# Patient Record
Sex: FEMALE | Race: WHITE | NOT HISPANIC OR LATINO | Employment: PART TIME | URBAN - METROPOLITAN AREA
[De-identification: names, ages, dates, MRNs, and addresses within clinical notes are randomized per-mention and may not be internally consistent; named-entity substitution may affect disease eponyms.]

---

## 2017-02-07 ENCOUNTER — TELEPHONE (OUTPATIENT)
Dept: OBSTETRICS AND GYNECOLOGY | Facility: CLINIC | Age: 52
End: 2017-02-07

## 2017-02-07 DIAGNOSIS — Z12.39 BREAST CANCER SCREENING: Primary | ICD-10-CM

## 2017-02-07 NOTE — TELEPHONE ENCOUNTER
Dr. Toribio's pt calling, pt is requesting a 3D screening mammo be sent to [facility]. Pt would like to be called once orders are sent at [phone].

## 2017-02-16 ENCOUNTER — OFFICE VISIT (OUTPATIENT)
Dept: OBSTETRICS AND GYNECOLOGY | Facility: CLINIC | Age: 52
End: 2017-02-16
Payer: COMMERCIAL

## 2017-02-16 VITALS
WEIGHT: 101.44 LBS | SYSTOLIC BLOOD PRESSURE: 110 MMHG | BODY MASS INDEX: 19.15 KG/M2 | DIASTOLIC BLOOD PRESSURE: 78 MMHG | HEIGHT: 61 IN

## 2017-02-16 DIAGNOSIS — R87.615 UNSATISFACTORY CERVICAL PAPANICOLAOU SMEAR: Primary | ICD-10-CM

## 2017-02-16 PROCEDURE — 99999 PR PBB SHADOW E&M-EST. PATIENT-LVL II: CPT | Mod: PBBFAC,,, | Performed by: OBSTETRICS & GYNECOLOGY

## 2017-02-16 PROCEDURE — 87624 HPV HI-RISK TYP POOLED RSLT: CPT

## 2017-02-16 PROCEDURE — 99212 OFFICE O/P EST SF 10 MIN: CPT | Mod: S$GLB,,, | Performed by: OBSTETRICS & GYNECOLOGY

## 2017-02-16 RX ORDER — CYCLOSPORINE 0.5 MG/ML
EMULSION OPHTHALMIC
COMMUNITY
Start: 2016-11-22 | End: 2024-02-19

## 2017-02-16 NOTE — PROGRESS NOTES
Subjective:       Patient ID: Terri Sanon is a 51 y.o. female.    Chief Complaint:  Well Woman (Annual Exam last pap  Hpv Equivical, last mammo 11/6/15 neg, Dexa 10/2013 osteopenia)      History of Present Illness  51-year-old status post annual exam 2016 with a normal Pap but an equivocal HPV.  Patient here for repeat HPV test.      GYN & OB History  No LMP recorded. Patient is postmenopausal.   Date of Last Pap: 2016    OB History    Para Term  AB SAB TAB Ectopic Multiple Living   4 3 3  1 1    3      # Outcome Date GA Lbr Tyler/2nd Weight Sex Delivery Anes PTL Lv   4 Term  39w0d  3.062 kg (6 lb 12 oz) F Vag-Spont   Y   3 Term  39w0d  3.175 kg (7 lb) M Vag-Spont   Y   2 SAB  7w0d       N   1 Term  40w0d  3.289 kg (7 lb 4 oz) M Vag-Spont   Y          Review of Systems  Review of Systems   Constitutional: Negative for fatigue and unexpected weight change.   Respiratory: Negative for shortness of breath.    Cardiovascular: Negative for chest pain.   Gastrointestinal: Negative for abdominal pain, constipation, diarrhea, nausea and vomiting.   Genitourinary: Negative for dysuria.   Musculoskeletal: Negative for back pain.   Skin: Negative for rash.   Neurological: Negative for headaches.   Hematological: Does not bruise/bleed easily.   Psychiatric/Behavioral: Negative for behavioral problems.        Objective:   Physical Exam:   Constitutional: She appears well-developed.               Genitourinary: Vagina normal and uterus normal. No vaginal discharge found.   Genitourinary Comments: Cervix appears normal.  Pap smear performed.                      Assessment/ Plan:     Orders Placed This Encounter    HPV DNA probe, amplified       Terri was seen today for well woman.    Diagnoses and all orders for this visit:    Unsatisfactory cervical Papanicolaou smear  -     HPV DNA probe, amplified   HPV was equivocal.  Here today for repeat HPV test only.      Return for  annual.      Health Maintenance       Date Due Completion Date    Hepatitis C Screening 1964 ---    Lipid Panel 1964 ---    TETANUS VACCINE 5/21/1982 ---    Mammogram 5/21/2004 ---    Colonoscopy 5/21/2014 ---    Influenza Vaccine 8/1/2016 ---    Pap Smear 8/4/2019 8/4/2016

## 2017-02-22 LAB — HUMAN PAPILLOMAVIRUS (HPV): DETECTED

## 2017-09-28 ENCOUNTER — OFFICE VISIT (OUTPATIENT)
Dept: OBSTETRICS AND GYNECOLOGY | Facility: CLINIC | Age: 52
End: 2017-09-28
Payer: COMMERCIAL

## 2017-09-28 VITALS
SYSTOLIC BLOOD PRESSURE: 107 MMHG | BODY MASS INDEX: 19.57 KG/M2 | WEIGHT: 103.63 LBS | HEIGHT: 61 IN | DIASTOLIC BLOOD PRESSURE: 78 MMHG

## 2017-09-28 DIAGNOSIS — Z11.51 SCREENING FOR HUMAN PAPILLOMAVIRUS: ICD-10-CM

## 2017-09-28 DIAGNOSIS — Z12.4 SCREENING FOR MALIGNANT NEOPLASM OF THE CERVIX: ICD-10-CM

## 2017-09-28 DIAGNOSIS — Z01.419 ENCOUNTER FOR GYNECOLOGICAL EXAMINATION: Primary | ICD-10-CM

## 2017-09-28 PROCEDURE — 99396 PREV VISIT EST AGE 40-64: CPT | Mod: S$GLB,,, | Performed by: OBSTETRICS & GYNECOLOGY

## 2017-09-28 PROCEDURE — 87624 HPV HI-RISK TYP POOLED RSLT: CPT

## 2017-09-28 PROCEDURE — 88175 CYTOPATH C/V AUTO FLUID REDO: CPT

## 2017-09-28 PROCEDURE — 99999 PR PBB SHADOW E&M-EST. PATIENT-LVL III: CPT | Mod: PBBFAC,,, | Performed by: OBSTETRICS & GYNECOLOGY

## 2017-09-28 NOTE — PROGRESS NOTES
"CC: Well woman exam    Terri Sanon is a 52 y.o. female  presents for a well woman exam.  She is established.  LMP: No LMP recorded. Patient is postmenopausal..   No c/o Doing well   Menopausal age 39. Denies current hot flashes, night sweats or vaginal dryness.  Denies abdominal pain or vaginal bleed   Annual Exam,   Last hpv  positive, last pap    normal, last mammo  [facility] normal    Health Maintenance   Topic Date Due    Hepatitis C Screening  1965    Lipid Panel  1964    TETANUS VACCINE  1982    Mammogram  2004    Colonoscopy  2014    Influenza Vaccine  2017    Pap Smear with HPV Cotest  2019         Past Medical History:   Diagnosis Date    Abnormal Pap smear of cervix 2016 pap normal but Hpv equivical,  Hpv positive    Osteopenia     Premature menopause     Menopausal age 39       Past Surgical History:   Procedure Laterality Date    APPENDECTOMY         OB History    Para Term  AB Living   4 3 3   1 3   SAB TAB Ectopic Multiple Live Births   1       3      # Outcome Date GA Lbr Tyler/2nd Weight Sex Delivery Anes PTL Lv   4 Term  39w0d  3.062 kg (6 lb 12 oz) F Vag-Spont   RAVINDRA   3 Term  39w0d  3.175 kg (7 lb) M Vag-Spont   RAVINDRA   2 SAB  7w0d       DEC    Term  40w0d  3.289 kg (7 lb 4 oz) M Vag-Spont   RAVINDRA          Family History   Problem Relation Age of Onset    Kidney failure Father     No Known Problems Mother     No Known Problems Brother     No Known Problems Sister     Breast cancer Neg Hx     Colon cancer Neg Hx     Diabetes Neg Hx        Social History   Substance Use Topics    Smoking status: Never Smoker    Smokeless tobacco: Never Used    Alcohol use Yes       /78   Ht 5' 1" (1.549 m)   Wt 47 kg (103 lb 9.9 oz)   BMI 19.58 kg/m²       ROS:  GENERAL: Denies weight gain or weight loss. Feeling well overall.   SKIN: Denies rash or lesions.   HEAD: " Denies head injury or headache.   NODES: Denies enlarged lymph nodes.   CHEST: Denies chest pain or shortness of breath.   CARDIOVASCULAR: Denies palpitations or left sided chest pain.   ABDOMEN: No abdominal pain, constipation, diarrhea, nausea, vomiting or rectal bleeding.   URINARY: No frequency, dysuria, hematuria, or burning on urination.    Physical Exam:    APPEARANCE: Well nourished, well developed, in no acute distress.  AFFECT: WNL, alert and oriented x 3  SKIN: No acne or hirsutism  ABDOMEN: Soft.  No tenderness or masses.  No hepatosplenomegaly.  No hernias.  BREASTS: Symmetrical, no skin changes or visible lesions.  No palpable masses, nipple discharge bilaterally.  PELVIC: Normal external genitalia without lesions.  Normal hair distribution.  Adequate perineal body, normal urethral meatus.  Vagina moist and well rugated without lesions or discharge.  Cervix pink, without lesions, discharge or tenderness.  No significant cystocele or rectocele.  Bimanual exam shows uterus to be normal size, regular, mobile and nontender.  Adnexa without masses or tenderness.    EXTREMITIES: No edema.    ASSESSMENT AND PLAN  1. Encounter for gynecological examination     2. Screening for malignant neoplasm of the cervix  Liquid-based pap smear, screening   3. Screening for human papillomavirus  HPV High Risk Genotypes, PCR       Patient was counseled today on A.C.S. Pap guidelines and recommendations for yearly pelvic exams, mammograms and monthly self breast exams; to see her PCP for other health maintenance.     Return in about 6 months (around 3/28/2018).

## 2017-10-05 LAB
HPV HR 12 DNA CVX QL NAA+PROBE: NEGATIVE
HPV16 DNA SPEC QL NAA+PROBE: NEGATIVE
HPV18 DNA SPEC QL NAA+PROBE: NEGATIVE

## 2017-10-05 NOTE — PROGRESS NOTES
Benjamin Holland,  Your pap is normal and your HPV test is negative.  JUANA BRADSHAW!!!!  Dr Toribio

## 2018-03-09 ENCOUNTER — TELEPHONE (OUTPATIENT)
Dept: OBSTETRICS AND GYNECOLOGY | Facility: CLINIC | Age: 53
End: 2018-03-09

## 2018-03-09 DIAGNOSIS — Z12.39 BREAST CANCER SCREENING: Primary | ICD-10-CM

## 2019-01-17 ENCOUNTER — OFFICE VISIT (OUTPATIENT)
Dept: OBSTETRICS AND GYNECOLOGY | Facility: CLINIC | Age: 54
End: 2019-01-17
Payer: COMMERCIAL

## 2019-01-17 VITALS
DIASTOLIC BLOOD PRESSURE: 80 MMHG | HEIGHT: 61 IN | SYSTOLIC BLOOD PRESSURE: 104 MMHG | WEIGHT: 100.63 LBS | BODY MASS INDEX: 19 KG/M2

## 2019-01-17 DIAGNOSIS — Z12.31 BREAST CANCER SCREENING BY MAMMOGRAM: Primary | ICD-10-CM

## 2019-01-17 DIAGNOSIS — Z12.4 ENCOUNTER FOR PAPANICOLAOU SMEAR FOR CERVICAL CANCER SCREENING: ICD-10-CM

## 2019-01-17 DIAGNOSIS — E28.319 PREMATURE MENOPAUSE: ICD-10-CM

## 2019-01-17 DIAGNOSIS — Z01.419 ENCOUNTER FOR GYNECOLOGICAL EXAMINATION: ICD-10-CM

## 2019-01-17 DIAGNOSIS — Z11.51 ENCOUNTER FOR SCREENING FOR HUMAN PAPILLOMAVIRUS (HPV): ICD-10-CM

## 2019-01-17 DIAGNOSIS — R87.810 CERVICAL HIGH RISK HPV (HUMAN PAPILLOMAVIRUS) TEST POSITIVE: ICD-10-CM

## 2019-01-17 DIAGNOSIS — R53.83 FATIGUE, UNSPECIFIED TYPE: ICD-10-CM

## 2019-01-17 PROCEDURE — 99999 PR PBB SHADOW E&M-EST. PATIENT-LVL IV: ICD-10-PCS | Mod: PBBFAC,,, | Performed by: OBSTETRICS & GYNECOLOGY

## 2019-01-17 PROCEDURE — 99396 PR PREVENTIVE VISIT,EST,40-64: ICD-10-PCS | Mod: S$GLB,,, | Performed by: OBSTETRICS & GYNECOLOGY

## 2019-01-17 PROCEDURE — 88175 CYTOPATH C/V AUTO FLUID REDO: CPT

## 2019-01-17 PROCEDURE — 99999 PR PBB SHADOW E&M-EST. PATIENT-LVL IV: CPT | Mod: PBBFAC,,, | Performed by: OBSTETRICS & GYNECOLOGY

## 2019-01-17 PROCEDURE — 87624 HPV HI-RISK TYP POOLED RSLT: CPT

## 2019-01-17 PROCEDURE — 99396 PREV VISIT EST AGE 40-64: CPT | Mod: S$GLB,,, | Performed by: OBSTETRICS & GYNECOLOGY

## 2019-01-17 RX ORDER — OLOPATADINE HYDROCHLORIDE 2 MG/ML
SOLUTION/ DROPS OPHTHALMIC
COMMUNITY
Start: 2018-11-06 | End: 2020-01-30

## 2019-01-17 RX ORDER — ONDANSETRON 8 MG/1
TABLET, ORALLY DISINTEGRATING ORAL
COMMUNITY
Start: 2018-11-06

## 2019-01-17 NOTE — PROGRESS NOTES
Chief Complaint Well woman exam:  Well Woman (last pap/hpv 2017 normal, Last mammo wnl: 2018 [facility].)      Terri Sanon is a 53 y.o. female  presents for a well woman exam.    She is established- Premature menopause patient desires lab work.    Review of Systems - Negative    No abdominal pain, No vaginal bleeding or discharge,   No breast pain or masses, No rectal bleeding       LMP: No LMP recorded. Patient is postmenopausal.  No HRT   Last pap: 10/4/2017 normal HPV negative previously normal HPV positive  Last MMG:  2018 [place] [facility]-RADS 1         Medication List           Accurate as of 19 12:57 PM. If you have any questions, ask your nurse or doctor.               CONTINUE taking these medications    calcium citrate-vitamin D3 250 mg calcium- 200 unit Tab     olopatadine 0.2 % Drop  Commonly known as:  PATADAY     ondansetron 8 MG Tbdl  Commonly known as:  ZOFRAN-ODT     RESTASIS 0.05 % ophthalmic emulsion  Generic drug:  cycloSPORINE     spironolactone 25 MG tablet  Commonly known as:  ALDACTONE            Past Medical History:   Diagnosis Date    Abnormal Pap smear of cervix 2016 pap normal but Hpv equivical, - Hpv positive    Osteopenia     Premature menopause     Menopausal age 39       Past Surgical History:   Procedure Laterality Date    APPENDECTOMY         OB History    Para Term  AB Living   4 3 3   1 3   SAB TAB Ectopic Multiple Live Births   1       3      # Outcome Date GA Lbr Tyler/2nd Weight Sex Delivery Anes PTL Lv   4 Term  39w0d  3.062 kg (6 lb 12 oz) F Vag-Spont   RAVINDRA   3 Term  39w0d  3.175 kg (7 lb) M Vag-Spont   RAVINDRA   2 SAB  7w0d       DEC   1 Term  40w0d  3.289 kg (7 lb 4 oz) M Vag-Spont   RAVINDRA          Family History   Problem Relation Age of Onset    Kidney failure Father     No Known Problems Mother     No Known Problems Brother     No Known Problems Sister     Breast cancer Neg Hx     Colon  "cancer Neg Hx     Diabetes Neg Hx        Social History     Tobacco Use    Smoking status: Never Smoker    Smokeless tobacco: Never Used   Substance Use Topics    Alcohol use: Yes     Comment: occ    Drug use: No         ROS:    GENERAL: Denies weight gain or weight loss. Feeling well overall.   SKIN: Denies rash or lesions.   HEENT: Denies headaches, or vision changes.   CARDIOVASCULAR: Denies palpitations or left sided chest pain.   RESPIRATORY: Denies shortness of breath or dyspnea on exertion.  BREASTS: Denies pain, lumps, or nipple discharge.   ABDOMEN: Denies abdominal pain, constipation, diarrhea, nausea, vomiting, change in appetite or rectal bleeding.   URINARY: Denies frequency, dysuria, hematuria.  NEUROLOGIC: Denies syncope or weakness.   PSYCHIATRIC: Denies depression, anxiety or mood swings.    Physical Exam:  /80   Ht 5' 1" (1.549 m)   Wt 45.7 kg (100 lb 10.2 oz)   BMI 19.02 kg/m²     APPEARANCE: Well nourished, well developed, in no acute distress.  AFFECT: WNL, alert and oriented x 3  SKIN: No acne or hirsutism  BREASTS: Symmetrical, no skin changes.                    No nipple discharge. No palpable masses bilaterally  NODES: No inguinal nor axillary LAD  ABDOMEN: soft Non tender Non distended No masses  PELVIC:   Normal external genitalia without lesions.   Normal urethral meatus. No signif cystocele or rectocele.  Vagina atrophic without lesions or discharge.    Cervix atrophic, without lesions, discharge or tenderness.    Bimanual exam shows uterus to be normal size, regular, mobile and nontender.  Adnexa without masses or tenderness.    EXTREMITIES: No edema.    ASSESSMENT AND PLAN    Terri was seen today for well woman.    Diagnoses and all orders for this visit:    Breast cancer screening by mammogram  -     Mammo Digital Screening Bilat w/ Joaquin; Future  -     Mammo Digital Screening Bilat w/ Joaquin    Encounter for gynecological examination    Fatigue, unspecified type  -     CBC " auto differential; Future  -     Comprehensive metabolic panel; Future  -     TSH; Future  -     T4; Future  -     Vitamin D; Future  -     LIPID PANEL; Future  -     CBC auto differential  -     Comprehensive metabolic panel  -     TSH  -     T4  -     Vitamin D  -     LIPID PANEL    Premature menopause  -     CBC auto differential; Future  -     Comprehensive metabolic panel; Future  -     TSH; Future  -     T4; Future  -     Vitamin D; Future  -     LIPID PANEL; Future  -     CBC auto differential  -     Comprehensive metabolic panel  -     TSH  -     T4  -     Vitamin D  -     LIPID PANEL    Cervical high risk HPV (human papillomavirus) test positive    Encounter for Papanicolaou smear for cervical cancer screening  -     Liquid-based pap smear, screening    Encounter for screening for human papillomavirus (HPV)  -     HPV High Risk Genotypes, PCR        Patient was counseled today on A.C.S. Pap guidelines and recommendations for yearly pelvic exams, mammograms and monthly self breast exams; to see her PCP for other health maintenance.     Patient encouraged to register for portal and results will be sent via portal.    Follow-up in about 1 year (around 1/17/2020).         Health Maintenance   Topic Date Due    Hepatitis C Screening  1965    Lipid Panel  1965    TETANUS VACCINE  05/21/1982    Mammogram  05/21/2004    Colonoscopy  05/21/2014    Influenza Vaccine  08/01/2018    Pap Smear with HPV Cotest  09/28/2020

## 2019-01-23 LAB
HPV HR 12 DNA CVX QL NAA+PROBE: NEGATIVE
HPV16 AG SPEC QL: NEGATIVE
HPV18 DNA SPEC QL NAA+PROBE: NEGATIVE

## 2019-02-26 ENCOUNTER — TELEPHONE (OUTPATIENT)
Dept: OBSTETRICS AND GYNECOLOGY | Facility: CLINIC | Age: 54
End: 2019-02-26

## 2019-02-26 DIAGNOSIS — Z00.00 PERIODIC HEALTH ASSESSMENT, GENERAL SCREENING, ADULT: Primary | ICD-10-CM

## 2019-02-26 NOTE — TELEPHONE ENCOUNTER
Pt needs [facility] panel and cholesterol. Can you fax the order to the pt [phone]. Pt wants to bring the order because she doenst know why it wasn't drawn

## 2019-03-19 ENCOUNTER — TELEPHONE (OUTPATIENT)
Dept: OBSTETRICS AND GYNECOLOGY | Facility: CLINIC | Age: 54
End: 2019-03-19

## 2019-05-07 ENCOUNTER — TELEPHONE (OUTPATIENT)
Dept: OBSTETRICS AND GYNECOLOGY | Facility: CLINIC | Age: 54
End: 2019-05-07

## 2019-05-07 DIAGNOSIS — Z12.31 BREAST CANCER SCREENING BY MAMMOGRAM: Primary | ICD-10-CM

## 2019-05-07 NOTE — TELEPHONE ENCOUNTER
Dr. Toribio pt called asking for 3D mmg orders to be sent to [facility]. Fax number is [phone].

## 2020-01-30 ENCOUNTER — OFFICE VISIT (OUTPATIENT)
Dept: OBSTETRICS AND GYNECOLOGY | Facility: CLINIC | Age: 55
End: 2020-01-30
Payer: COMMERCIAL

## 2020-01-30 VITALS
HEIGHT: 61 IN | BODY MASS INDEX: 18.31 KG/M2 | DIASTOLIC BLOOD PRESSURE: 62 MMHG | WEIGHT: 97 LBS | SYSTOLIC BLOOD PRESSURE: 114 MMHG

## 2020-01-30 DIAGNOSIS — M85.80 OSTEOPENIA, UNSPECIFIED LOCATION: ICD-10-CM

## 2020-01-30 DIAGNOSIS — Z12.4 SCREENING FOR MALIGNANT NEOPLASM OF CERVIX: ICD-10-CM

## 2020-01-30 DIAGNOSIS — Z11.51 SCREENING FOR HUMAN PAPILLOMAVIRUS: ICD-10-CM

## 2020-01-30 DIAGNOSIS — Z01.419 ENCOUNTER FOR GYNECOLOGICAL EXAMINATION: Primary | ICD-10-CM

## 2020-01-30 DIAGNOSIS — Z12.31 BREAST CANCER SCREENING BY MAMMOGRAM: ICD-10-CM

## 2020-01-30 PROCEDURE — 99999 PR PBB SHADOW E&M-EST. PATIENT-LVL III: ICD-10-PCS | Mod: PBBFAC,,, | Performed by: OBSTETRICS & GYNECOLOGY

## 2020-01-30 PROCEDURE — 99999 PR PBB SHADOW E&M-EST. PATIENT-LVL III: CPT | Mod: PBBFAC,,, | Performed by: OBSTETRICS & GYNECOLOGY

## 2020-01-30 PROCEDURE — 87624 HPV HI-RISK TYP POOLED RSLT: CPT

## 2020-01-30 PROCEDURE — 99396 PREV VISIT EST AGE 40-64: CPT | Mod: S$GLB,,, | Performed by: OBSTETRICS & GYNECOLOGY

## 2020-01-30 PROCEDURE — 88175 CYTOPATH C/V AUTO FLUID REDO: CPT

## 2020-01-30 PROCEDURE — 99396 PR PREVENTIVE VISIT,EST,40-64: ICD-10-PCS | Mod: S$GLB,,, | Performed by: OBSTETRICS & GYNECOLOGY

## 2020-01-30 RX ORDER — SPIRONOLACTONE 100 MG/1
TABLET, FILM COATED ORAL
COMMUNITY
Start: 2020-01-21 | End: 2024-02-19

## 2020-01-30 RX ORDER — TOBRAMYCIN/DEXAMETHASONE 0.3 %-0.1%
OINTMENT (GRAM) OPHTHALMIC (EYE)
COMMUNITY
Start: 2019-12-12 | End: 2021-04-29

## 2020-01-30 NOTE — PROGRESS NOTES
Chief Complaint: well woman exam  Well Woman (Annual Exam, Last pap/hpv 2019 negative, Last mammo  [facility] per pt., )      Terri Sanon is a 54 y.o. female  presents for a well woman exam.    She is established.  No complaints.       LMP: No LMP recorded. Patient is postmenopausal..    Last pap: Normal and HPV neg     Prev HPV pos   Last MM [facility] per pt We do not have results- will sign release of info  Last colonoscopy:  Age 49 normal        Medication List with Changes/Refills   Current Medications    CALCIUM CITRATE-VITAMIN D3 250 MG CALCIUM- 200 UNIT TAB    once a day    ONDANSETRON (ZOFRAN-ODT) 8 MG TBDL        RESTASIS 0.05 % OPHTHALMIC EMULSION        SPIRONOLACTONE (ALDACTONE) 100 MG TABLET        TOBRADEX 0.3-0.1 % OINT    apply to both eyes daily as directed   Discontinued Medications    OLOPATADINE (PATADAY) 0.2 % DROP        SPIRONOLACTONE (ALDACTONE) 25 MG TABLET    once a day       Past Medical History:   Diagnosis Date    Abnormal Pap smear of cervix 2016 pap normal but Hpv equivical, - Hpv positive    Osteopenia     Premature menopause     Menopausal age 39       Past Surgical History:   Procedure Laterality Date    APPENDECTOMY         OB History    Para Term  AB Living   4 3 3   1 3   SAB TAB Ectopic Multiple Live Births   1       3      # Outcome Date GA Lbr Tyler/2nd Weight Sex Delivery Anes PTL Lv   4 Term  39w0d  3.062 kg (6 lb 12 oz) F Vag-Spont   RAVINDRA   3 Term  39w0d  3.175 kg (7 lb) M Vag-Spont   RAVINDRA   2 SAB  7w0d       DEC   1 Term  40w0d  3.289 kg (7 lb 4 oz) M Vag-Spont   RAVINDRA       Family History   Problem Relation Age of Onset    Kidney failure Father     No Known Problems Mother     No Known Problems Brother     No Known Problems Sister     Breast cancer Neg Hx     Colon cancer Neg Hx     Diabetes Neg Hx        Social History     Tobacco Use    Smoking status: Never Smoker     "Smokeless tobacco: Never Used   Substance Use Topics    Alcohol use: Yes     Comment: occ    Drug use: No         ROS:  GENERAL: Denies weight gain or weight loss. Feeling well overall.   SKIN: Denies rash or lesions.   HEENT: Denies headaches, or vision changes.   CARDIOVASCULAR: Denies palpitations or left sided chest pain.   RESPIRATORY: Denies shortness of breath or dyspnea on exertion.  BREASTS: Denies pain, lumps, or nipple discharge.   ABDOMEN: Denies abdominal pain, constipation, diarrhea, nausea, vomiting, change in appetite or rectal bleeding.   URINARY: Denies frequency, dysuria, hematuria.  NEUROLOGIC: Denies syncope or weakness.   PSYCHIATRIC: Denies depression, anxiety or mood swings.    Physical Exam:  /62   Ht 5' 1" (1.549 m)   Wt 44 kg (97 lb)   BMI 18.33 kg/m²     APPEARANCE: Well nourished, well developed, in no acute distress.  AFFECT: WNL, alert and oriented x 3  SKIN: No acne or hirsutism  BREASTS: Symmetrical, no skin changes.                     No nipple discharge. No palpable masses bilaterally  NODES: No inguinal nor axillary LAD  ABDOMEN: soft Non tender Non distended No masses  PELVIC:   Normal external genitalia without lesions.  Normal hair distribution.   Adequate perineal body, normal urethral meatus. No signif cystocele or rectocele.  Vagina moist and well rugated without lesions or discharge.    Cervix pink, without lesions, discharge or tenderness.     PAP performed  But gets vasosvagal every nancy  Bimanual exam shows uterus to be normal size, regular, mobile and nontender.  Adnexa without masses or tenderness.    EXTREMITIES: No edema.        ASSESSMENT AND PLAN    Terri was seen today for well woman.    Diagnoses and all orders for this visit:    Encounter for gynecological examination    Breast cancer screening by mammogram  -     Mammo Digital Screening Bilat w/ [facility]; Future  -     Mammo Digital Screening Bilat w/ [facility]    Osteopenia, unspecified location  -     DXA " Bone Density Spine And Hip; Future  -     DXA Bone Density Spine And Hip          Follow up in about 1 year (around 1/30/2021).    Patient was counseled today on A.C.S. Pap guidelines and recommendations for yearly pelvic exams, mammograms and monthly self breast exams; to see her PCP for other health maintenance.     Patient encouraged to register for portal and results will be sent via portal.       Health Maintenance   Topic Date Due    Hepatitis C Screening  1965    Lipid Panel  1965    TETANUS VACCINE  05/21/1982    Mammogram  05/21/2004    Colonoscopy  05/21/2014    Pap Smear with HPV Cotest  01/17/2022

## 2020-02-06 LAB
HPV HR 12 DNA SPEC QL NAA+PROBE: NEGATIVE
HPV16 AG SPEC QL: NEGATIVE
HPV18 DNA SPEC QL NAA+PROBE: NEGATIVE

## 2020-02-24 LAB
FINAL PATHOLOGIC DIAGNOSIS: NORMAL
Lab: NORMAL

## 2020-09-10 ENCOUNTER — TELEPHONE (OUTPATIENT)
Dept: OBSTETRICS AND GYNECOLOGY | Facility: CLINIC | Age: 55
End: 2020-09-10

## 2020-09-10 DIAGNOSIS — M85.80 OSTEOPENIA, UNSPECIFIED LOCATION: ICD-10-CM

## 2020-09-10 DIAGNOSIS — Z12.31 BREAST CANCER SCREENING BY MAMMOGRAM: Primary | ICD-10-CM

## 2020-09-10 NOTE — TELEPHONE ENCOUNTER
Beck Ortiz B Staff 37 minutes ago (2:14 PM)     Pt would like orders for a 3D mammo and bone density faxed to [facility]. Fax # [phone]. Please notify patient when orders are faxed.    Routing comment       Terri Sanon [phone]  Beck Burleson 38 minutes ago (2:13 PM)

## 2021-04-26 ENCOUNTER — PATIENT MESSAGE (OUTPATIENT)
Dept: RESEARCH | Facility: HOSPITAL | Age: 56
End: 2021-04-26

## 2021-04-29 ENCOUNTER — OFFICE VISIT (OUTPATIENT)
Dept: OBSTETRICS AND GYNECOLOGY | Facility: CLINIC | Age: 56
End: 2021-04-29
Attending: OBSTETRICS & GYNECOLOGY
Payer: COMMERCIAL

## 2021-04-29 VITALS
HEIGHT: 61 IN | BODY MASS INDEX: 18.31 KG/M2 | DIASTOLIC BLOOD PRESSURE: 80 MMHG | WEIGHT: 97 LBS | SYSTOLIC BLOOD PRESSURE: 122 MMHG

## 2021-04-29 DIAGNOSIS — M81.0 OSTEOPOROSIS, UNSPECIFIED OSTEOPOROSIS TYPE, UNSPECIFIED PATHOLOGICAL FRACTURE PRESENCE: ICD-10-CM

## 2021-04-29 DIAGNOSIS — Z01.419 ENCOUNTER FOR GYNECOLOGICAL EXAMINATION: Primary | ICD-10-CM

## 2021-04-29 DIAGNOSIS — Z12.31 BREAST CANCER SCREENING BY MAMMOGRAM: ICD-10-CM

## 2021-04-29 PROCEDURE — 1126F AMNT PAIN NOTED NONE PRSNT: CPT | Mod: S$GLB,,, | Performed by: OBSTETRICS & GYNECOLOGY

## 2021-04-29 PROCEDURE — 3008F BODY MASS INDEX DOCD: CPT | Mod: CPTII,S$GLB,, | Performed by: OBSTETRICS & GYNECOLOGY

## 2021-04-29 PROCEDURE — 1126F PR PAIN SEVERITY QUANTIFIED, NO PAIN PRESENT: ICD-10-PCS | Mod: S$GLB,,, | Performed by: OBSTETRICS & GYNECOLOGY

## 2021-04-29 PROCEDURE — 99999 PR PBB SHADOW E&M-EST. PATIENT-LVL III: CPT | Mod: PBBFAC,,, | Performed by: OBSTETRICS & GYNECOLOGY

## 2021-04-29 PROCEDURE — 99396 PR PREVENTIVE VISIT,EST,40-64: ICD-10-PCS | Mod: S$GLB,,, | Performed by: OBSTETRICS & GYNECOLOGY

## 2021-04-29 PROCEDURE — 99999 PR PBB SHADOW E&M-EST. PATIENT-LVL III: ICD-10-PCS | Mod: PBBFAC,,, | Performed by: OBSTETRICS & GYNECOLOGY

## 2021-04-29 PROCEDURE — 99396 PREV VISIT EST AGE 40-64: CPT | Mod: S$GLB,,, | Performed by: OBSTETRICS & GYNECOLOGY

## 2021-04-29 PROCEDURE — 3008F PR BODY MASS INDEX (BMI) DOCUMENTED: ICD-10-PCS | Mod: CPTII,S$GLB,, | Performed by: OBSTETRICS & GYNECOLOGY

## 2022-03-09 ENCOUNTER — OFFICE VISIT (OUTPATIENT)
Dept: URGENT CARE | Facility: CLINIC | Age: 57
End: 2022-03-09
Payer: COMMERCIAL

## 2022-03-09 VITALS
BODY MASS INDEX: 18.31 KG/M2 | SYSTOLIC BLOOD PRESSURE: 99 MMHG | TEMPERATURE: 99 F | HEART RATE: 94 BPM | OXYGEN SATURATION: 96 % | HEIGHT: 61 IN | RESPIRATION RATE: 18 BRPM | WEIGHT: 97 LBS | DIASTOLIC BLOOD PRESSURE: 70 MMHG

## 2022-03-09 DIAGNOSIS — H61.21 IMPACTED CERUMEN OF RIGHT EAR: Primary | ICD-10-CM

## 2022-03-09 PROCEDURE — 99203 OFFICE O/P NEW LOW 30 MIN: CPT | Mod: 25,S$GLB,, | Performed by: PHYSICIAN ASSISTANT

## 2022-03-09 PROCEDURE — 69209 REMOVE IMPACTED EAR WAX UNI: CPT | Mod: RT,S$GLB,, | Performed by: PHYSICIAN ASSISTANT

## 2022-03-09 PROCEDURE — 99203 PR OFFICE/OUTPT VISIT, NEW, LEVL III, 30-44 MIN: ICD-10-PCS | Mod: 25,S$GLB,, | Performed by: PHYSICIAN ASSISTANT

## 2022-03-09 PROCEDURE — 1159F MED LIST DOCD IN RCRD: CPT | Mod: CPTII,S$GLB,, | Performed by: PHYSICIAN ASSISTANT

## 2022-03-09 PROCEDURE — 69209 EAR CERUMEN REMOVAL: ICD-10-PCS | Mod: RT,S$GLB,, | Performed by: PHYSICIAN ASSISTANT

## 2022-03-09 PROCEDURE — 3008F PR BODY MASS INDEX (BMI) DOCUMENTED: ICD-10-PCS | Mod: CPTII,S$GLB,, | Performed by: PHYSICIAN ASSISTANT

## 2022-03-09 PROCEDURE — 3078F DIAST BP <80 MM HG: CPT | Mod: CPTII,S$GLB,, | Performed by: PHYSICIAN ASSISTANT

## 2022-03-09 PROCEDURE — 3074F SYST BP LT 130 MM HG: CPT | Mod: CPTII,S$GLB,, | Performed by: PHYSICIAN ASSISTANT

## 2022-03-09 PROCEDURE — 3008F BODY MASS INDEX DOCD: CPT | Mod: CPTII,S$GLB,, | Performed by: PHYSICIAN ASSISTANT

## 2022-03-09 PROCEDURE — 3078F PR MOST RECENT DIASTOLIC BLOOD PRESSURE < 80 MM HG: ICD-10-PCS | Mod: CPTII,S$GLB,, | Performed by: PHYSICIAN ASSISTANT

## 2022-03-09 PROCEDURE — 1159F PR MEDICATION LIST DOCUMENTED IN MEDICAL RECORD: ICD-10-PCS | Mod: CPTII,S$GLB,, | Performed by: PHYSICIAN ASSISTANT

## 2022-03-09 PROCEDURE — 3074F PR MOST RECENT SYSTOLIC BLOOD PRESSURE < 130 MM HG: ICD-10-PCS | Mod: CPTII,S$GLB,, | Performed by: PHYSICIAN ASSISTANT

## 2022-03-09 NOTE — PROCEDURES
"Ear Cerumen Removal    Date/Time: 3/9/2022 3:30 PM  Performed by: Stewart Mariscal PA-C  Authorized by: Stewart Mariscal PA-C     Time out: Immediately prior to procedure a "time out" was called to verify the correct patient, procedure, equipment, support staff and site/side marked as required.    Consent Done?:  Yes (Verbal)    Local anesthetic:  None  Ceruminolytics applied: Ceruminolytics applied prior to the procedure    Ceruminolytic: colace.  Location details:  Right ear  Procedure type: curette and irrigation    Cerumen  Removal Results:  Cerumen completely removed  Patient tolerance:  Patient tolerated the procedure well with no immediate complications      "

## 2022-03-09 NOTE — PROGRESS NOTES
"Subjective:       Patient ID: Terri Sanon is a 56 y.o. female.    Vitals:  height is 5' 1" (1.549 m) and weight is 44 kg (97 lb). Her temperature is 98.9 °F (37.2 °C). Her blood pressure is 99/70 and her pulse is 94. Her respiration is 18 and oxygen saturation is 96%.     Chief Complaint: Otalgia    56-year-old modest who presents urgent care clinic for evaluation.  Pt states that  Her right ear is muffled and has wax build up for the last couple of days .  She normally sees ENT specialist outside of [facility] to have wax removal every few years.  Unable to see her ENT specialist so presented to urgent care.  No other associated symptoms.    Otalgia   There is pain in the right ear. This is a new problem. The current episode started in the past 7 days. The problem occurs constantly. The problem has been unchanged. There has been no fever. The pain is at a severity of 0/10. The patient is experiencing no pain. Pertinent negatives include no abdominal pain, coughing, diarrhea, headaches, hearing loss, neck pain, rash, sore throat or vomiting. She has tried nothing for the symptoms.       Constitution: Negative for activity change, appetite change, chills, sweating, fatigue, fever and generalized weakness.   HENT: Positive for ear pain. Negative for hearing loss, facial swelling, congestion, postnasal drip, sinus pain, sinus pressure, sore throat, trouble swallowing and voice change.    Neck: Negative for neck pain, neck stiffness and painful lymph nodes.   Cardiovascular: Negative for chest pain, leg swelling, palpitations, sob on exertion and passing out.   Eyes: Negative for eye discharge, eye pain, photophobia, vision loss, double vision and blurred vision.   Respiratory: Negative for chest tightness, cough, sputum production, bloody sputum, COPD, shortness of breath, stridor, wheezing and asthma.    Gastrointestinal: Negative for abdominal pain, nausea, vomiting, constipation, diarrhea, bright red blood " in stool, rectal bleeding, heartburn and bowel incontinence.   Genitourinary: Negative for dysuria, frequency, urgency, urine decreased, flank pain, bladder incontinence and hematuria.   Musculoskeletal: Negative for trauma, joint pain, joint swelling, abnormal ROM of joint, muscle cramps and muscle ache.   Skin: Negative for color change, pale, rash and wound.   Allergic/Immunologic: Negative for seasonal allergies, asthma and immunocompromised state.   Neurological: Negative for dizziness, history of vertigo, light-headedness, passing out, facial drooping, speech difficulty, coordination disturbances, loss of balance, headaches, disorientation, altered mental status, loss of consciousness, numbness, tingling and seizures.   Hematologic/Lymphatic: Negative for swollen lymph nodes, easy bruising/bleeding and trouble clotting. Does not bruise/bleed easily.   Psychiatric/Behavioral: Negative for altered mental status and disorientation.       Past Medical History:   Diagnosis Date    Abnormal Pap smear of cervix 2016 8-2016 pap normal but Hpv equivical, 2-2017 Hpv positive    Osteoporosis     Premature menopause     Menopausal age 39       Objective:      Physical Exam   Constitutional: She appears well-developed. She is cooperative.  Non-toxic appearance. She does not appear ill. No distress.   HENT:   Head: Normocephalic and atraumatic.   Ears:   Right Ear: Hearing, external ear and ear canal normal. There is cerumen present. No drainage, swelling or tenderness. impacted cerumen  Left Ear: Hearing, external ear and ear canal normal. No drainage, swelling or tenderness. impacted cerumen  Nose: Nose normal. No rhinorrhea or congestion.   Mouth/Throat: Uvula is midline, oropharynx is clear and moist and mucous membranes are normal. Mucous membranes are moist. No posterior oropharyngeal edema. No tonsillar exudate. Oropharynx is clear.   Eyes: Conjunctivae, EOM and lids are normal. Pupils are equal, round, and  reactive to light. Right eye exhibits no discharge. Left eye exhibits no discharge.      extraocular movement intact   Neck: Neck supple. No neck rigidity present.   Cardiovascular: Normal rate, regular rhythm and normal pulses.   Pulmonary/Chest: Effort normal. No accessory muscle usage. No respiratory distress. She has no wheezes. She exhibits no tenderness.   Abdominal: Normal appearance. She exhibits no distension. Soft. There is no abdominal tenderness.   Musculoskeletal: Normal range of motion.         General: Normal range of motion.      Right lower leg: No edema.      Left lower leg: No edema.      Comments: Moves all extremities with normal tone, strength, and ROM.   Neurological: no focal deficit. She is alert and at baseline. She has normal motor skills, normal sensation and intact cranial nerves. She displays no weakness, facial symmetry and normal reflexes. No cranial nerve deficit or sensory deficit. She exhibits normal muscle tone. Gait and coordination normal. Coordination normal.   Skin: Skin is warm, dry, not diaphoretic and no rash. Capillary refill takes less than 2 seconds.   Psychiatric: Her behavior is normal. Mood and thought content normal.   Nursing note and vitals reviewed.        Assessment:       1. Impacted cerumen of right ear        Patient presents with clinical exam findings and history consistent with cerumen impaction. On exam, patient is nontoxic appearing and vitals are stable.  Patient is essentially neurovascularly intact on exam.   post cerumen removal with no erythema, drainage, or abrasion and external canal.  Patient had complete resolution of hearing loss and significant improvements in right ear after procedure.  Bilateral tympanic membrane intact. Patient was recommended OTC treatments for their symptoms.      Patient was referred back to ENT for evaluation is symptoms do not improve or worsen.  [facility]  If symptoms do not  improve/worsens, patient was referred back to PCP for continued outpatient workup and management.     Patient was instructed to return for re-evaluation for any worsening or change in current symptoms. Strict ED versus clinic precautions given in depth. Discharge and follow-up instructions given verbally/printed with the patient who expressed understanding and willingness to comply with my recommendations.  Patient verbalized understanding and agreed with the entirety of plan of care.    Note dictated with voice recognition software, please excuse any grammatical errors.    Plan:         Impacted cerumen of right ear  -     Ear wax removal  -     Ear Cerumen Removal              Additional MDM:     Heart Failure Score:   COPD = No      Patient Instructions   PLEASE READ YOUR DISCHARGE INSTRUCTIONS ENTIRELY AS IT CONTAINS IMPORTANT INFORMATION.    For your ear wax cerumen impaction/buildup:  Can use OTC debrox solution as directed or hydrogen peroxide/mineral oil dipped in a cotton ball and placing in the external canal once per week to help liquefy cerumen and aid the normal elimination mechanisms. Let it soak for 30 minutes before cleaning.     - Tylenol or Ibuprofen as directed as needed for pain.  For Tylenol, do not exceed 4000 mg/ day. For ibuprofen, do not exceed 2400 mg/day.    - if no improvement or worsening symptoms, recommend follow-up with ENT  for further evaluation.    - If you smoke, please stop smoking.    -You must understand that you've received an Urgent Care treatment only and that you may be released before all your medical problems are known or treated. You, the patient, will arrange for follow up care as instructed. Please arrange follow up with your primary medical clinic within 2-5 days if your signs and symptoms have not resolved or worsen.     - Follow up with your PCP or specialty clinic as directed.  You can call [phone] or [phone] to schedule an appointment with the  appropriate provider.    - If your condition worsens or fails to improve, we recommend that you receive another evaluation at the emergency room immediately or contact your primary medical clinic to discuss your concerns  in next 2-5 days.  Strict clinic versus ER precautions given.     RED FLAGS/WARNING SYMPTOMS DISCUSSED WITH PATIENT THAT WOULD WARRANT EMERGENT MEDICAL ATTENTION. Patient aware and verbalized understanding.

## 2022-03-09 NOTE — PATIENT INSTRUCTIONS
PLEASE READ YOUR DISCHARGE INSTRUCTIONS ENTIRELY AS IT CONTAINS IMPORTANT INFORMATION.    For your ear wax cerumen impaction/buildup:  Can use OTC debrox solution as directed or hydrogen peroxide/mineral oil dipped in a cotton ball and placing in the external canal once per week to help liquefy cerumen and aid the normal elimination mechanisms. Let it soak for 30 minutes before cleaning.     - Tylenol or Ibuprofen as directed as needed for pain.  For Tylenol, do not exceed 4000 mg/ day. For ibuprofen, do not exceed 2400 mg/day.    - if no improvement or worsening symptoms, recommend follow-up with ENT  for further evaluation.    - If you smoke, please stop smoking.    -You must understand that you've received an Urgent Care treatment only and that you may be released before all your medical problems are known or treated. You, the patient, will arrange for follow up care as instructed. Please arrange follow up with your primary medical clinic within 2-5 days if your signs and symptoms have not resolved or worsen.     - Follow up with your PCP or specialty clinic as directed.  You can call [phone] or [phone] to schedule an appointment with the appropriate provider.    - If your condition worsens or fails to improve, we recommend that you receive another evaluation at the emergency room immediately or contact your primary medical clinic to discuss your concerns  in next 2-5 days.  Strict clinic versus ER precautions given.     RED FLAGS/WARNING SYMPTOMS DISCUSSED WITH PATIENT THAT WOULD WARRANT EMERGENT MEDICAL ATTENTION. Patient aware and verbalized understanding.

## 2022-06-13 ENCOUNTER — OFFICE VISIT (OUTPATIENT)
Dept: OBSTETRICS AND GYNECOLOGY | Facility: CLINIC | Age: 57
End: 2022-06-13
Attending: OBSTETRICS & GYNECOLOGY
Payer: COMMERCIAL

## 2022-06-13 VITALS
SYSTOLIC BLOOD PRESSURE: 120 MMHG | DIASTOLIC BLOOD PRESSURE: 70 MMHG | HEIGHT: 61 IN | BODY MASS INDEX: 19.59 KG/M2 | WEIGHT: 103.75 LBS

## 2022-06-13 DIAGNOSIS — Z01.419 ENCOUNTER FOR GYNECOLOGICAL EXAMINATION: Primary | ICD-10-CM

## 2022-06-13 DIAGNOSIS — Z12.31 SCREENING MAMMOGRAM FOR BREAST CANCER: ICD-10-CM

## 2022-06-13 PROCEDURE — 99999 PR PBB SHADOW E&M-EST. PATIENT-LVL III: CPT | Mod: PBBFAC,,, | Performed by: OBSTETRICS & GYNECOLOGY

## 2022-06-13 PROCEDURE — 99396 PR PREVENTIVE VISIT,EST,40-64: ICD-10-PCS | Mod: S$GLB,,, | Performed by: OBSTETRICS & GYNECOLOGY

## 2022-06-13 PROCEDURE — 3074F SYST BP LT 130 MM HG: CPT | Mod: CPTII,S$GLB,, | Performed by: OBSTETRICS & GYNECOLOGY

## 2022-06-13 PROCEDURE — 3078F PR MOST RECENT DIASTOLIC BLOOD PRESSURE < 80 MM HG: ICD-10-PCS | Mod: CPTII,S$GLB,, | Performed by: OBSTETRICS & GYNECOLOGY

## 2022-06-13 PROCEDURE — 3008F PR BODY MASS INDEX (BMI) DOCUMENTED: ICD-10-PCS | Mod: CPTII,S$GLB,, | Performed by: OBSTETRICS & GYNECOLOGY

## 2022-06-13 PROCEDURE — 3074F PR MOST RECENT SYSTOLIC BLOOD PRESSURE < 130 MM HG: ICD-10-PCS | Mod: CPTII,S$GLB,, | Performed by: OBSTETRICS & GYNECOLOGY

## 2022-06-13 PROCEDURE — 1159F MED LIST DOCD IN RCRD: CPT | Mod: CPTII,S$GLB,, | Performed by: OBSTETRICS & GYNECOLOGY

## 2022-06-13 PROCEDURE — 3078F DIAST BP <80 MM HG: CPT | Mod: CPTII,S$GLB,, | Performed by: OBSTETRICS & GYNECOLOGY

## 2022-06-13 PROCEDURE — 3008F BODY MASS INDEX DOCD: CPT | Mod: CPTII,S$GLB,, | Performed by: OBSTETRICS & GYNECOLOGY

## 2022-06-13 PROCEDURE — 99999 PR PBB SHADOW E&M-EST. PATIENT-LVL III: ICD-10-PCS | Mod: PBBFAC,,, | Performed by: OBSTETRICS & GYNECOLOGY

## 2022-06-13 PROCEDURE — 1159F PR MEDICATION LIST DOCUMENTED IN MEDICAL RECORD: ICD-10-PCS | Mod: CPTII,S$GLB,, | Performed by: OBSTETRICS & GYNECOLOGY

## 2022-06-13 PROCEDURE — 99396 PREV VISIT EST AGE 40-64: CPT | Mod: S$GLB,,, | Performed by: OBSTETRICS & GYNECOLOGY

## 2022-06-13 NOTE — PROGRESS NOTES
"Chief Complaint Well woman exam:  Well Woman (Last pap 20 neg Hpv neg , Mammo 10/27/21 [place] birads 1 ,Colonoscopy 50yrs normal )    She is established    Terri Sanon is a 57 y.o. female  presents for a well woman exam.    No c/o No hot flashes or night sweats     Review of Systems - :   No abdominal pain, No vaginal bleeding or discharge,   No breast pain or masses, No rectal bleeding     Meds by MD:  LMP: No LMP recorded (lmp unknown). Patient is postmenopausal.    Last pap 20 neg Hpv neg ,  Last Mammo 10/27/21 [facility] birads 1 ,  Last Colonoscopy 50yrs normal     Past Medical History:   Diagnosis Date    Abnormal Pap smear of cervix 2016 pap normal but Hpv equivical, - Hpv positive    Osteoporosis     Premature menopause     Menopausal age 39       Past Surgical History:   Procedure Laterality Date    APPENDECTOMY         OB History    Para Term  AB Living   4 3 3   1 3   SAB IAB Ectopic Multiple Live Births   1       3      # Outcome Date GA Lbr Tyler/2nd Weight Sex Delivery Anes PTL Lv   4 Term  39w0d  3.062 kg (6 lb 12 oz) F Vag-Spont   RAVINDRA   3 Term  39w0d  3.175 kg (7 lb) M Vag-Spont   RAVINDRA   2 SAB  7w0d       DEC    Term  40w0d  3.289 kg (7 lb 4 oz) M Vag-Spont   RAVINDRA       Family History   Problem Relation Age of Onset    Kidney failure Father     No Known Problems Mother     No Known Problems Brother     No Known Problems Sister     Breast cancer Neg Hx     Colon cancer Neg Hx     Diabetes Neg Hx        Social History     Tobacco Use    Smoking status: Never Smoker    Smokeless tobacco: Never Used   Substance Use Topics    Alcohol use: Yes     Comment: occ    Drug use: No         Physical Exam:  /70   Ht 5' 1" (1.549 m)   Wt 47 kg (103 lb 11.6 oz)   LMP  (LMP Unknown) Comment:     BMI 19.60 kg/m²     APPEARANCE: Well nourished, well developed, in no acute distress.  AFFECT: WNL, alert and oriented x " 3  SKIN: No acne or hirsutism  BREASTS: Symmetrical, no skin changes.                     No nipple discharge.   No palpable masses bilaterally  NODES: No inguinal nor axillary LAD  ABDOMEN: soft Non tender Non distended No masses  PELVIC:   Normal external genitalia without lesions.   Normal urethral meatus.    Small cystocele   Vagina atrophic without lesions or discharge.    Cervix atrophic, without lesions, discharge or tenderness.    Bimanual exam shows uterus to be normal size, regular, mobile and nontender.    Adnexa without masses or tenderness.    EXTREMITIES: No edema.    ASSESSMENT AND PLAN    Terri was seen today for well woman.    Diagnoses and all orders for this visit:    Encounter for gynecological examination    Screening mammogram for breast cancer  -     Mammo Digital Screening Bilat w/ [facility]aquin; Future  -     Mammo Digital Screening Bilat w/ [facility]    Refer to PCP for yearly physical      Patient was counseled today on A.C.S. Pap guidelines and recommendations for yearly pelvic exams, mammograms and monthly self breast exams; to see her PCP for other health maintenance.     Patient encouraged to register for portal and results will be sent via portal.    Follow up in about 1 year (around 6/13/2023) for annual.         Health Maintenance   Topic Date Due    Hepatitis C Screening  Never done    Lipid Panel  Never done    TETANUS VACCINE  Never done    Mammogram  10/27/2022

## 2023-01-23 NOTE — PROGRESS NOTES
Just wanted to let you know that I got your mammogram results back and the radiologist reading is perfectly normal. Let me know if you have any questions or concerns  Hope you have a great day!  Dr Toribio

## 2024-02-19 ENCOUNTER — OFFICE VISIT (OUTPATIENT)
Dept: OBSTETRICS AND GYNECOLOGY | Facility: CLINIC | Age: 59
End: 2024-02-19
Attending: OBSTETRICS & GYNECOLOGY
Payer: COMMERCIAL

## 2024-02-19 VITALS — HEIGHT: 61 IN | WEIGHT: 105.81 LBS | BODY MASS INDEX: 19.98 KG/M2

## 2024-02-19 DIAGNOSIS — N39.3 SUI (STRESS URINARY INCONTINENCE, FEMALE): ICD-10-CM

## 2024-02-19 DIAGNOSIS — Z01.419 ENCOUNTER FOR GYNECOLOGICAL EXAMINATION: Primary | ICD-10-CM

## 2024-02-19 DIAGNOSIS — Z12.31 SCREENING MAMMOGRAM FOR BREAST CANCER: ICD-10-CM

## 2024-02-19 DIAGNOSIS — N95.2 VAGINAL ATROPHY: ICD-10-CM

## 2024-02-19 DIAGNOSIS — Z12.4 ENCOUNTER FOR PAPANICOLAOU SMEAR FOR CERVICAL CANCER SCREENING: ICD-10-CM

## 2024-02-19 DIAGNOSIS — N95.1 MENOPAUSAL SYMPTOMS: ICD-10-CM

## 2024-02-19 DIAGNOSIS — Z11.51 ENCOUNTER FOR SCREENING FOR HUMAN PAPILLOMAVIRUS (HPV): ICD-10-CM

## 2024-02-19 DIAGNOSIS — M81.0 OSTEOPOROSIS, UNSPECIFIED OSTEOPOROSIS TYPE, UNSPECIFIED PATHOLOGICAL FRACTURE PRESENCE: ICD-10-CM

## 2024-02-19 PROCEDURE — 1159F MED LIST DOCD IN RCRD: CPT | Mod: CPTII,S$GLB,, | Performed by: OBSTETRICS & GYNECOLOGY

## 2024-02-19 PROCEDURE — 88175 CYTOPATH C/V AUTO FLUID REDO: CPT | Performed by: OBSTETRICS & GYNECOLOGY

## 2024-02-19 PROCEDURE — 99999 PR PBB SHADOW E&M-EST. PATIENT-LVL III: CPT | Mod: PBBFAC,,, | Performed by: OBSTETRICS & GYNECOLOGY

## 2024-02-19 PROCEDURE — 87624 HPV HI-RISK TYP POOLED RSLT: CPT | Performed by: OBSTETRICS & GYNECOLOGY

## 2024-02-19 PROCEDURE — 3008F BODY MASS INDEX DOCD: CPT | Mod: CPTII,S$GLB,, | Performed by: OBSTETRICS & GYNECOLOGY

## 2024-02-19 PROCEDURE — 99396 PREV VISIT EST AGE 40-64: CPT | Mod: S$GLB,,, | Performed by: OBSTETRICS & GYNECOLOGY

## 2024-02-19 RX ORDER — ESTRADIOL 0.04 MG/D
1 FILM, EXTENDED RELEASE TRANSDERMAL
Qty: 8 PATCH | Refills: 11 | Status: SHIPPED | OUTPATIENT
Start: 2024-02-19 | End: 2024-02-20 | Stop reason: SDUPTHER

## 2024-02-19 RX ORDER — PROGESTERONE 100 MG/1
100 CAPSULE ORAL NIGHTLY
Qty: 30 CAPSULE | Refills: 11 | Status: SHIPPED | OUTPATIENT
Start: 2024-02-19 | End: 2024-02-20 | Stop reason: SDUPTHER

## 2024-02-19 RX ORDER — ESTRADIOL 0.1 MG/G
CREAM VAGINAL
Qty: 42.5 G | Refills: 1 | Status: SHIPPED | OUTPATIENT
Start: 2024-02-19 | End: 2024-02-20 | Stop reason: SDUPTHER

## 2024-02-19 NOTE — PROGRESS NOTES
LMP: No LMP recorded (lmp unknown). Patient is postmenopausal..    Meds per MD: none    Last Pap:  pap & hpv negative  Last MM birads 1 [facility]ica  Last Colonoscopy: age 49, normal  Last Bone Density:  [place] osteoporosis

## 2024-02-19 NOTE — PROGRESS NOTES
Chief Complaint Well woman exam:  Annual Exam    She is established    Terri Sanon is a 58 y.o. female  presents for a well woman exam.    C/o insomnia, vaginal dryness, IVAN, increased anxiety over the past few years, brain fog  Discussed HRT pro and con   Pt has done her research and would like to try them   Understands risk and benefit  Pt went thru menopause age 45 and now with all of these sx worsening would like to start HRT after discussion  Rec coronary calcium score with PCP  No hx MI or fam hx heart ds   Would like PT referral for IVAN     Review of Systems - :   No abdominal pain, No vaginal bleeding or discharge,   No breast pain or masses, No rectal bleeding    LMP: No LMP recorded (lmp unknown). Patient is postmenopausal..    Meds per MD: none     Last Pap:  pap & hpv negative  Last MM birads 1 [facility] Mojica  Last Colonoscopy: age 49, normal  Last Bone Density:  [place] osteoporosis      Past Medical History:   Diagnosis Date    Abnormal Pap smear of cervix 2016 pap normal but Hpv equivical, - Hpv positive    Osteoporosis     Premature menopause     Menopausal age 39       Past Surgical History:   Procedure Laterality Date    APPENDECTOMY         OB History    Para Term  AB Living   4 3 3   1 3   SAB IAB Ectopic Multiple Live Births   1       3      # Outcome Date GA Lbr Tyler/2nd Weight Sex Delivery Anes PTL Lv   4 Term  39w0d  3.062 kg (6 lb 12 oz) F Vag-Spont   RAVINDRA   3 Term  39w0d  3.175 kg (7 lb) M Vag-Spont   RAVINDRA   2 SAB  7w0d       DEC   1 Term  40w0d  3.289 kg (7 lb 4 oz) M Vag-Spont   RAVINDRA       Family History   Problem Relation Age of Onset    Kidney failure Father     Pneumonia Father     No Known Problems Mother     No Known Problems Brother     No Known Problems Sister     Breast cancer Neg Hx     Colon cancer Neg Hx     Diabetes Neg Hx        Social History     Tobacco Use    Smoking status: Never    Smokeless  "tobacco: Never   Substance Use Topics    Alcohol use: Yes     Comment: occ    Drug use: No         Physical Exam:  Ht 5' 1" (1.549 m)   Wt 48 kg (105 lb 13.1 oz)   LMP  (LMP Unknown) Comment:     BMI 19.99 kg/m²     APPEARANCE: Well nourished, well developed, in no acute distress.  AFFECT: WNL, alert and oriented x 3  SKIN: No acne or hirsutism  BREASTS: Symmetrical, no skin changes.                     No nipple discharge.   No palpable masses bilaterally  NODES: No inguinal nor axillary LAD  ABDOMEN: soft Non tender Non distended No masses  PELVIC:   Normal external genitalia without lesions.   Normal urethral meatus.    No signif cystocele or rectocele.  Vagina without lesions or discharge.    Cervix without lesions, discharge or tenderness.  Pap done  Bimanual exam shows uterus to be normal size, regular, mobile and nontender.    Adnexa without masses or tenderness.    EXTREMITIES: No edema.    ASSESSMENT AND PLAN    Terri was seen today for annual exam.    Diagnoses and all orders for this visit:    Encounter for gynecological examination    IVAN (stress urinary incontinence, female)  -     Ambulatory referral/consult to Physical/Occupational Therapy; Future  -     estradioL (ESTRACE) 0.01 % (0.1 mg/gram) vaginal cream; Apply pea size amount to urethra and vagina daily    Osteoporosis, unspecified osteoporosis type, unspecified pathological fracture presence  -     DXA Bone Density Axial Skeleton 1 or more sites; Future  -     DXA Bone Density Axial Skeleton 1 or more sites    Screening mammogram for breast cancer  -     Mammo Digital Screening Bilat w/ [facility]; Future  -     Mammo Digital Screening Bilat w/ [facility]    Encounter for screening for human papillomavirus (HPV)  -     HPV High Risk Genotypes, PCR    Encounter for Papanicolaou smear for cervical cancer screening  -     Liquid-Based Pap Smear, Screening    Vaginal atrophy  -     estradioL (ESTRACE) 0.01 % (0.1 mg/gram) vaginal cream; Apply pea size " amount to urethra and vagina daily    Menopausal symptoms  -     progesterone (PROMETRIUM) 100 MG capsule; Take 1 capsule (100 mg total) by mouth nightly.  -     estradioL (VIVELLE-DOT) 0.0375 mg/24 hr; Place 1 patch onto the skin twice a week.      Follow up in about 3 months (around 5/19/2024) for medication follow up and for PT f/u   May need to adjust HRT  If IVAN not better might need urogyn referral      Patient was counseled today on A.C.S. Pap guidelines and recommendations for yearly pelvic exams, mammograms and monthly self breast exams; to see her PCP for other health maintenance.     Patient encouraged to register for portal and results will be sent via portal.             Health Maintenance   Topic Date Due    Hepatitis C Screening  Never done    Lipid Panel  Never done    TETANUS VACCINE  Never done    Colorectal Cancer Screening  Never done    Shingles Vaccine (1 of 2) Never done    Mammogram  01/19/2024

## 2024-02-20 ENCOUNTER — TELEPHONE (OUTPATIENT)
Dept: OBSTETRICS AND GYNECOLOGY | Facility: CLINIC | Age: 59
End: 2024-02-20

## 2024-02-20 DIAGNOSIS — N95.1 MENOPAUSAL SYMPTOMS: ICD-10-CM

## 2024-02-20 DIAGNOSIS — N39.3 SUI (STRESS URINARY INCONTINENCE, FEMALE): ICD-10-CM

## 2024-02-20 DIAGNOSIS — N95.2 VAGINAL ATROPHY: ICD-10-CM

## 2024-02-20 RX ORDER — ESTRADIOL 0.04 MG/D
1 FILM, EXTENDED RELEASE TRANSDERMAL
Qty: 24 PATCH | Refills: 3 | Status: SHIPPED | OUTPATIENT
Start: 2024-02-22 | End: 2024-06-01 | Stop reason: SDUPTHER

## 2024-02-20 RX ORDER — PROGESTERONE 100 MG/1
100 CAPSULE ORAL NIGHTLY
Qty: 90 CAPSULE | Refills: 3 | Status: SHIPPED | OUTPATIENT
Start: 2024-02-20 | End: 2024-06-01 | Stop reason: SDUPTHER

## 2024-02-20 RX ORDER — ESTRADIOL 0.1 MG/G
CREAM VAGINAL
Qty: 42.5 G | Refills: 3 | Status: SHIPPED | OUTPATIENT
Start: 2024-02-20 | End: 2024-06-01 | Stop reason: SDUPTHER

## 2024-02-20 NOTE — TELEPHONE ENCOUNTER
Spoke with pt, would like her three rx's from yesterdays visit [facility].    Does Not want to go to [facility] pelvic floor therapy, would like to go to [facility] on the [facility], fax order to [phone]    Also, was given external orders for [facility] on the [facility] but they want the orders faxed. Pt does not have the fax number, phone number is [phone]

## 2024-02-21 LAB
FINAL PATHOLOGIC DIAGNOSIS: NORMAL
Lab: NORMAL

## 2024-02-28 ENCOUNTER — TELEPHONE (OUTPATIENT)
Dept: OBSTETRICS AND GYNECOLOGY | Facility: CLINIC | Age: 59
End: 2024-02-28
Payer: COMMERCIAL

## 2024-05-20 ENCOUNTER — OFFICE VISIT (OUTPATIENT)
Dept: OBSTETRICS AND GYNECOLOGY | Facility: CLINIC | Age: 59
End: 2024-05-20
Payer: COMMERCIAL

## 2024-05-20 DIAGNOSIS — N95.1 MENOPAUSAL SYMPTOMS: ICD-10-CM

## 2024-05-20 DIAGNOSIS — N39.3 SUI (STRESS URINARY INCONTINENCE, FEMALE): ICD-10-CM

## 2024-05-20 DIAGNOSIS — N95.2 VAGINAL ATROPHY: ICD-10-CM

## 2024-05-20 DIAGNOSIS — M81.0 OSTEOPOROSIS, UNSPECIFIED OSTEOPOROSIS TYPE, UNSPECIFIED PATHOLOGICAL FRACTURE PRESENCE: Primary | ICD-10-CM

## 2024-05-20 PROCEDURE — 99213 OFFICE O/P EST LOW 20 MIN: CPT | Mod: 95,,, | Performed by: OBSTETRICS & GYNECOLOGY

## 2024-05-28 ENCOUNTER — TELEPHONE (OUTPATIENT)
Dept: OBSTETRICS AND GYNECOLOGY | Facility: CLINIC | Age: 59
End: 2024-05-28
Payer: COMMERCIAL

## 2024-05-28 NOTE — TELEPHONE ENCOUNTER
----- Message from Thuy Rizzo sent at 5/28/2024  3:56 PM CDT -----  Type: RX Refill Request    Who Called: pt     Have you contacted your pharmacy:    Refill or New Rx:progesterone (PROMETRIUM) 100 MG capsule     estradioL (VIVELLE-DOT) 0.0375 mg/24 hr     estradioL (ESTRACE) 0.01 % (0.1 mg/gram) vaginal cream     RX Name and Strength:    How is the patient currently taking it? (ex. 1XDay):    Is this a 30 day or 90 day RX:    Preferred Pharmacy with phone number:   [facility] - [address] [address]  Phone: [phone] Fax: [phone]        Local or Mail Order:    Ordering Provider:    Would the patient rather a call back or a response via [facility]? call    Best Call Back Number:[phone] (home)       Additional Information:

## 2024-06-01 RX ORDER — ESTRADIOL 0.1 MG/G
CREAM VAGINAL
Qty: 42.5 G | Refills: 3 | Status: SHIPPED | OUTPATIENT
Start: 2024-06-01 | End: 2024-06-04 | Stop reason: SDUPTHER

## 2024-06-01 RX ORDER — ESTRADIOL 0.04 MG/D
1 FILM, EXTENDED RELEASE TRANSDERMAL
Qty: 24 PATCH | Refills: 3 | Status: SHIPPED | OUTPATIENT
Start: 2024-06-03 | End: 2024-06-04 | Stop reason: SDUPTHER

## 2024-06-01 RX ORDER — PROGESTERONE 100 MG/1
100 CAPSULE ORAL NIGHTLY
Qty: 90 CAPSULE | Refills: 3 | Status: SHIPPED | OUTPATIENT
Start: 2024-06-01 | End: 2025-06-01

## 2024-06-01 NOTE — PROGRESS NOTES
Subjective:       Patient ID: Terri Sanon is a 59 y.o. female.    Chief Complaint:  HRT f/u      History of Present Illness  Here for f/u of menopausal sx since starting HRT 3 months ago for  insomnia, vaginal dryness, IVAN, increased anxiety over the past few years, brain fog  Pt went thru menopause late 40s and now with all of these sx worsening   Rx HRT  3 months ago but has not started it yet   Has Osteoporosis.  Fracture risk is high. T Hip -3.0lk  already on Vitamin D supplementation, Exercises 3 or more times a week     progesterone (PROMETRIUM) 100 MG capsule; Take 1 capsule (100 mg total) by mouth nightly.  -     estradioL (VIVELLE-DOT) 0.0375 mg/24 hr; Place 1 patch onto the skin twice a week.   Estrace to urethra and vagina 2x week      GYN & OB History  No LMP recorded (lmp unknown). Patient is postmenopausal.   Date of Last Pap: 2/21/2024         Objective:     There were no vitals filed for this visit.    Physical Exam  deferred     Assessment/ Plan:     Orders Placed This Encounter    estradioL (VIVELLE-DOT) 0.0375 mg/24 hr    estradioL (ESTRACE) 0.01 % (0.1 mg/gram) vaginal cream    progesterone (PROMETRIUM) 100 MG capsule       Diagnoses and all orders for this visit:    Menopausal symptoms- pro and con of HRT again discussed with pt    Rec reading Estrogen Matters and the New menopause   -     estradioL (VIVELLE-DOT) 0.0375 mg/24 hr; Place 1 patch onto the skin twice a week.  -     progesterone (PROMETRIUM) 100 MG capsule; Take 1 capsule (100 mg total) by mouth nightly.    Osteoporosis- discussed tx options and pt would like to try hormones     IVAN (stress urinary incontinence, female)  -     estradioL (ESTRACE) 0.01 % (0.1 mg/gram) vaginal cream; Apply pea size amount to urethra and vagina daily    Vaginal atrophy  -     estradioL (ESTRACE) 0.01 % (0.1 mg/gram) vaginal cream; Apply pea size amount to urethra and vagina daily      F/u in 6 months      Health Maintenance         Date Due  Completion Date    Hepatitis C Screening Never done ---    Lipid Panel Never done ---    HIV Screening Never done ---    TETANUS VACCINE Never done ---    Colorectal Cancer Screening Never done ---    Shingles Vaccine (1 of 2) Never done ---    COVID-19 Vaccine (3 - 2023-24 season) 09/01/2023 2/9/2021    RSV Vaccine (Age 60+ and Pregnant patients) (1 - 1-dose 60+ series) Never done ---    Influenza Vaccine (Season Ended) 09/01/2024 ---    Mammogram 03/06/2025 3/6/2024    Cervical Cancer Screening 02/19/2029 2/19/2024

## 2024-06-04 DIAGNOSIS — N39.3 SUI (STRESS URINARY INCONTINENCE, FEMALE): ICD-10-CM

## 2024-06-04 DIAGNOSIS — N95.1 MENOPAUSAL SYMPTOMS: ICD-10-CM

## 2024-06-04 DIAGNOSIS — N95.2 VAGINAL ATROPHY: ICD-10-CM

## 2024-06-04 RX ORDER — ESTRADIOL 0.1 MG/G
CREAM VAGINAL
Qty: 42.5 G | Refills: 3 | Status: SHIPPED | OUTPATIENT
Start: 2024-06-04

## 2024-06-04 RX ORDER — ESTRADIOL 0.04 MG/D
1 FILM, EXTENDED RELEASE TRANSDERMAL
Qty: 24 PATCH | Refills: 3 | Status: SHIPPED | OUTPATIENT
Start: 2024-06-06 | End: 2025-06-06

## 2024-08-13 DIAGNOSIS — N95.1 MENOPAUSAL SYMPTOMS: ICD-10-CM

## 2024-08-13 RX ORDER — ESTRADIOL 0.04 MG/D
1 FILM, EXTENDED RELEASE TRANSDERMAL
Qty: 24 PATCH | Refills: 3 | Status: SHIPPED | OUTPATIENT
Start: 2024-08-15 | End: 2025-08-15

## 2024-10-28 ENCOUNTER — OFFICE VISIT (OUTPATIENT)
Dept: OBSTETRICS AND GYNECOLOGY | Facility: CLINIC | Age: 59
End: 2024-10-28
Payer: COMMERCIAL

## 2024-10-28 DIAGNOSIS — M81.0 OSTEOPOROSIS, UNSPECIFIED OSTEOPOROSIS TYPE, UNSPECIFIED PATHOLOGICAL FRACTURE PRESENCE: ICD-10-CM

## 2024-10-28 DIAGNOSIS — N95.0 PMB (POSTMENOPAUSAL BLEEDING): Primary | ICD-10-CM

## 2024-10-28 DIAGNOSIS — N95.1 MENOPAUSAL SYMPTOMS: ICD-10-CM

## 2024-10-28 PROCEDURE — 99214 OFFICE O/P EST MOD 30 MIN: CPT | Mod: 95,,, | Performed by: OBSTETRICS & GYNECOLOGY

## 2024-10-28 RX ORDER — ESTRADIOL 0.5 MG/1
0.5 TABLET ORAL DAILY
Qty: 90 TABLET | Refills: 3 | Status: SHIPPED | OUTPATIENT
Start: 2024-10-28 | End: 2025-10-28

## 2024-11-05 ENCOUNTER — PATIENT MESSAGE (OUTPATIENT)
Dept: OBSTETRICS AND GYNECOLOGY | Facility: CLINIC | Age: 59
End: 2024-11-05
Payer: COMMERCIAL

## 2024-11-05 DIAGNOSIS — N95.1 MENOPAUSAL SYMPTOMS: Primary | ICD-10-CM

## 2024-11-12 RX ORDER — ESTRADIOL 0.03 MG/D
1 FILM, EXTENDED RELEASE TRANSDERMAL
Qty: 8 PATCH | Refills: 11 | Status: SHIPPED | OUTPATIENT
Start: 2024-11-14

## 2024-11-27 DIAGNOSIS — N95.1 MENOPAUSAL SYMPTOMS: ICD-10-CM

## 2024-11-27 RX ORDER — ESTRADIOL 0.03 MG/D
1 FILM, EXTENDED RELEASE TRANSDERMAL
Qty: 24 PATCH | Refills: 3 | Status: SHIPPED | OUTPATIENT
Start: 2024-11-27

## 2025-02-20 ENCOUNTER — OFFICE VISIT (OUTPATIENT)
Dept: OBSTETRICS AND GYNECOLOGY | Facility: CLINIC | Age: 60
End: 2025-02-20
Payer: COMMERCIAL

## 2025-02-20 VITALS
HEIGHT: 61 IN | WEIGHT: 112 LBS | DIASTOLIC BLOOD PRESSURE: 74 MMHG | SYSTOLIC BLOOD PRESSURE: 111 MMHG | BODY MASS INDEX: 21.14 KG/M2

## 2025-02-20 DIAGNOSIS — M81.0 OSTEOPOROSIS, UNSPECIFIED OSTEOPOROSIS TYPE, UNSPECIFIED PATHOLOGICAL FRACTURE PRESENCE: ICD-10-CM

## 2025-02-20 DIAGNOSIS — Z01.419 ENCOUNTER FOR GYNECOLOGICAL EXAMINATION: Primary | ICD-10-CM

## 2025-02-20 DIAGNOSIS — N39.3 SUI (STRESS URINARY INCONTINENCE, FEMALE): ICD-10-CM

## 2025-02-20 DIAGNOSIS — Z12.31 SCREENING MAMMOGRAM FOR BREAST CANCER: ICD-10-CM

## 2025-02-20 DIAGNOSIS — N95.1 MENOPAUSAL SYMPTOMS: ICD-10-CM

## 2025-02-20 DIAGNOSIS — N95.2 VAGINAL ATROPHY: ICD-10-CM

## 2025-02-20 RX ORDER — PROGESTERONE 100 MG/1
100 CAPSULE ORAL NIGHTLY
Qty: 90 CAPSULE | Refills: 3 | Status: SHIPPED | OUTPATIENT
Start: 2025-02-20 | End: 2025-02-20 | Stop reason: ALTCHOICE

## 2025-02-20 RX ORDER — ESTRADIOL 0.1 MG/G
CREAM VAGINAL
Qty: 42.5 G | Refills: 3 | Status: SHIPPED | OUTPATIENT
Start: 2025-02-20 | End: 2025-02-20 | Stop reason: SDUPTHER

## 2025-02-20 RX ORDER — ESTRADIOL 0.1 MG/G
CREAM VAGINAL
Qty: 42.5 G | Refills: 3 | Status: SHIPPED | OUTPATIENT
Start: 2025-02-20

## 2025-02-20 RX ORDER — PROGESTERONE 200 MG/1
200 CAPSULE ORAL NIGHTLY
Qty: 90 CAPSULE | Refills: 3 | Status: SHIPPED | OUTPATIENT
Start: 2025-02-20 | End: 2026-02-20

## 2025-02-20 NOTE — PROGRESS NOTES
Chief Complaint Well woman exam:  Annual Exam (Last pap/hpv 2024 normal/Last mammo 3/2024 birads:1/Last bone dx 3/2024 showed osteoporosis and osteopenia )    She is established    Terri Sanon is a 59 y.o. female  presents for a well woman exam.    Sees Amena Patterson MD  On Edilia 1/2 patch 0.025 and Prometrium 100  Had spotting/light bleeding when started on whole patch  Has been doing ok since startign 1/2 patch   Discussed today increasing Prometrium to 200 mg and continuing with a half a patch for a month or 2 to make sure spotting does not recur.  If after 2 months she has had no spotting then she can go back up to a whole patch.  Patient would like to be on estrogen for treatment and prevention of osteoporosis.      Review of Systems - :   No abdominal pain, No vaginal bleeding or discharge,   No breast pain or masses, No rectal bleeding     Meds by MD:  LMP: No LMP recorded (lmp unknown). Patient is postmenopausal.    Last pap/hpv 2024 normal/  Last mammo 3/2024 birads:1/  Last bone dx 3/2024 showed osteoporosis and osteopenia )  Last cologuard neg       Past Medical History:   Diagnosis Date    Abnormal Pap smear of cervix 2016 pap normal but Hpv equivical, - Hpv positive    Osteoporosis     Premature menopause     Menopausal age 39       Past Surgical History:   Procedure Laterality Date    APPENDECTOMY         OB History    Para Term  AB Living   4 3 3  1 3   SAB IAB Ectopic Multiple Live Births   1    3      # Outcome Date GA Lbr Tyler/2nd Weight Sex Type Anes PTL Lv   4 Term  39w0d  3.062 kg (6 lb 12 oz) F Vag-Spont   RAVINDRA   3 Term  39w0d  3.175 kg (7 lb) M Vag-Spont   RAVINDRA   2 SAB  7w0d       DEC   1 Term  40w0d  3.289 kg (7 lb 4 oz) M Vag-Spont   RAVINDRA       Family History   Problem Relation Name Age of Onset    Kidney failure Father      Pneumonia Father      No Known Problems Mother      No Known Problems Brother      No Known Problems Sister   "    Breast cancer Neg Hx      Colon cancer Neg Hx      Diabetes Neg Hx         Social History[1]        Physical Exam:  /74 (Patient Position: Sitting)   Ht 5' 1" (1.549 m)   Wt 50.8 kg (111 lb 15.9 oz)   LMP  (LMP Unknown) Comment:     BMI 21.16 kg/m²     APPEARANCE: Well nourished, well developed, in no acute distress.  AFFECT: WNL, alert and oriented x 3  SKIN: No acne or hirsutism  BREASTS: Symmetrical, no skin changes.                     No nipple discharge.   No palpable masses bilaterally  NODES: No inguinal nor axillary LAD  ABDOMEN: soft Non tender Non distended No masses  PELVIC: Female chaperone was present in the room during pelvic exam.   Normal external genitalia without lesions.   Normal urethral meatus.    No signif cystocele or rectocele.  Vagina atrophic without lesions or discharge.    Cervix atrophic, without lesions, discharge or tenderness.  PAP Not done  Bimanual exam shows uterus to be normal size, regular, mobile and nontender.    Adnexa without masses or tenderness.    EXTREMITIES: No edema.    ASSESSMENT AND PLAN    Terri Wilcox" was seen today for annual exam.    Diagnoses and all orders for this visit:    Encounter for gynecological examination   -PAP UTD next due 2027  - normal exam   - MMG UTD next due 3/2025 order given   - Colon screening UTD         - BP normotensive    Menopausal symptoms  -  Cont Edilia patch - pt has refills- Rx in Nov   -Plan to increase from 100mg to 200 of Prometrium  -     progesterone (PROMETRIUM) 200 MG capsule; Take 1 capsule (200 mg total) by mouth nightly.    IVAN (stress urinary incontinence, female)  -     Discontinue: estradioL (ESTRACE) 0.01 % (0.1 mg/gram) vaginal cream; Apply pea size amount to urethra and vagina daily  -     estradioL (ESTRACE) 0.01 % (0.1 mg/gram) vaginal cream; Apply pea size amount to urethra and vagina daily    Vaginal atrophy  -     estradioL (ESTRACE) 0.01 %  mg/gram) vaginal cream; Apply pea size amount to " urethra and vagina daily    Screening mammogram for breast cancer  -     Mammo Digital Screening Bilat w/ Joaquin (XPD); Future  -     Mammo Digital Screening Bilat w/ Joaquin (XPD)    Osteoporosis, unspecified osteoporosis type, unspecified pathological fracture presence  -     DXA Bone Density Axial Skeleton 1 or more sites; Future        -  Patient was counseled today on A.C.S. Pap guidelines and recommendations for yearly pelvic exams, mammograms and monthly self breast exams; to see her PCP for other health maintenance.     Patient encouraged to register for portal and results will be sent via portal.    No follow-ups on file.         Health Maintenance   Topic Date Due    Hepatitis C Screening  Never done    Lipid Panel  Never done    HIV Screening  Never done    TETANUS VACCINE  Never done    Shingles Vaccine (1 of 2) Never done    Pneumococcal Vaccines (Age 50+) (1 of 1 - PCV) Never done    Influenza Vaccine (1) Never done    COVID-19 Vaccine (3 - 2024-25 season) 09/01/2024    Mammogram  03/06/2025    Colorectal Cancer Screening  12/10/2027    Cervical Cancer Screening  02/19/2029    RSV Vaccine (Age 60+ and Pregnant patients) (1 - 1-dose 75+ series) 05/21/2039                                [1]   Social History  Tobacco Use    Smoking status: Never    Smokeless tobacco: Never   Substance Use Topics    Alcohol use: Yes     Comment: occ    Drug use: No

## 2025-02-27 ENCOUNTER — TELEPHONE (OUTPATIENT)
Dept: OBSTETRICS AND GYNECOLOGY | Facility: CLINIC | Age: 60
End: 2025-02-27
Payer: COMMERCIAL

## 2025-03-31 ENCOUNTER — RESULTS FOLLOW-UP (OUTPATIENT)
Dept: OBSTETRICS AND GYNECOLOGY | Facility: CLINIC | Age: 60
End: 2025-03-31

## 2025-07-31 ENCOUNTER — OFFICE VISIT (OUTPATIENT)
Dept: OBSTETRICS AND GYNECOLOGY | Facility: CLINIC | Age: 60
End: 2025-07-31
Payer: COMMERCIAL

## 2025-07-31 VITALS — WEIGHT: 108 LBS | HEIGHT: 61 IN | BODY MASS INDEX: 20.39 KG/M2

## 2025-07-31 DIAGNOSIS — N81.2 UTEROVAGINAL PROLAPSE, INCOMPLETE: Primary | ICD-10-CM

## 2025-07-31 PROCEDURE — 99999 PR PBB SHADOW E&M-EST. PATIENT-LVL III: CPT | Mod: PBBFAC,,, | Performed by: OBSTETRICS & GYNECOLOGY

## 2025-07-31 PROCEDURE — 99213 OFFICE O/P EST LOW 20 MIN: CPT | Mod: S$GLB,,, | Performed by: OBSTETRICS & GYNECOLOGY

## 2025-07-31 PROCEDURE — 3008F BODY MASS INDEX DOCD: CPT | Mod: CPTII,S$GLB,, | Performed by: OBSTETRICS & GYNECOLOGY

## 2025-07-31 PROCEDURE — 1159F MED LIST DOCD IN RCRD: CPT | Mod: CPTII,S$GLB,, | Performed by: OBSTETRICS & GYNECOLOGY

## 2025-07-31 RX ORDER — ICOSAPENT ETHYL 1 G/1
CAPSULE ORAL
COMMUNITY
Start: 2025-05-22

## 2025-07-31 RX ORDER — PROGESTERONE 100 MG/1
CAPSULE ORAL
COMMUNITY
Start: 2025-07-09

## 2025-07-31 RX ORDER — ISOTRETINOIN 20 MG/1
20 CAPSULE ORAL 2 TIMES DAILY
COMMUNITY
Start: 2025-07-15

## 2025-07-31 RX ORDER — ISOTRETINOIN 10 MG/1
10 CAPSULE ORAL 2 TIMES DAILY
COMMUNITY
Start: 2025-04-03

## 2025-07-31 RX ORDER — OLOPATADINE HYDROCHLORIDE 2 MG/ML
SOLUTION OPHTHALMIC
COMMUNITY
Start: 2025-07-17

## 2025-07-31 RX ORDER — PERFLUOROHEXYLOCTANE 1 MG/MG
SOLUTION OPHTHALMIC
COMMUNITY
Start: 2025-07-18

## 2025-08-03 RX ORDER — DIAZEPAM 5 MG/1
TABLET ORAL
Qty: 5 TABLET | Refills: 0 | Status: SHIPPED | OUTPATIENT
Start: 2025-08-03

## 2025-08-04 ENCOUNTER — TELEPHONE (OUTPATIENT)
Dept: UROGYNECOLOGY | Facility: CLINIC | Age: 60
End: 2025-08-04
Payer: COMMERCIAL

## 2025-08-04 NOTE — PROGRESS NOTES
"Subjective:       Patient ID: Terri Sanon is a 60 y.o. female.    Chief Complaint:  Possible prolapse      History of Present Illness  60-year-old menopausal female on a half of a Edilia patch 0.025 as well as Prometrium 100 mg  Here today for feeling a bulge firm in nature when she has a bowel movement.  Patient reports having to push it back up.  This seems to have started over the last several months.      GYN & OB History  No LMP recorded (lmp unknown). Patient is postmenopausal.   Date of Last Pap: 2024 normal HPV neg  [place] 20[place] [facility] [place] 2.9%    OB History    Para Term  AB Living   4 3 3  1 3   SAB IAB Ectopic Multiple Live Births   1    3      # Outcome Date GA Lbr Tyler/2nd Weight Sex Type Anes PTL Lv   4 Term  39w0d  3.062 kg (6 lb 12 oz) F Vag-Spont   RAVINDRA   3 Term  39w0d  3.175 kg (7 lb) M Vag-Spont   RAVINDRA   2 SAB  7w0d       DEC    Term  40w0d  3.289 kg (7 lb 4 oz) M Vag-Spont   RAVINDRA       Objective:     There were no vitals filed for this visit.    Physical Exam:   Constitutional: She appears well-developed and well-nourished.             Abdominal: Soft. She exhibits no mass. There is no abdominal tenderness.     Genitourinary:    Pelvic exam was performed with patient supine.                 Neurological: She is alert.     PELVIC:   Normal external genitalia without lesions.  Patient examined while lying down with Valsalva and with standing.  Significant utero cervical prolapse to the hymen with Valsalva.  Vagina moist and well rugated without lesions or discharge.    Cervix pink, without lesions, discharge or tenderness.   Bimanual exam shows uterus to be normal size, regular, mobile and nontender.    Adnexa without masses or tenderness.         Assessment/ Plan:     Orders Placed This Encounter    Ambulatory referral/consult to Urogynecology    diazePAM (VALIUM) 5 MG tablet       Terri Wilcox" was seen today for " possible prolapse.    Diagnoses and all orders for this visit:    Uterovaginal prolapse, incomplete  -     Ambulatory referral/consult to Urogynecology; Future  -     diazePAM (VALIUM) 5 MG tablet; Take one tab 1-2 hrs prior to procedure/examination    Discussed options with patient.  Patient would like to proceed with urogynecological evaluation.  Patient understands that her choices might include pessary, physical therapy, or surgery.  Patient understands that if she has surgery she will need a hysterectomy.  We will refer for urogynecology evaluation.  We will give Valium prior to exam as patient often gets vagal so vasa goal during examination          Health Maintenance         Date Due Completion Date    Hepatitis C Screening Never done ---    Lipid Panel Never done ---    HIV Screening Never done ---    TETANUS VACCINE Never done ---    Shingles Vaccine (1 of 2) Never done ---    Pneumococcal Vaccines (Age 50+) (1 of 1 - PCV) Never done ---    COVID-19 Vaccine (3 - 2024-25 season) 09/01/2024 2/9/2021    Influenza Vaccine (1) 09/01/2025 ---    DEXA Scan 03/06/2026 3/6/2024    Mammogram 03/31/2026 3/31/2025    Colorectal Cancer Screening 12/10/2027 12/10/2024    Cervical Cancer Screening 02/19/2029 2/19/2024    RSV Vaccine (Age 60+ and Pregnant patients) (1 - 1-dose 75+ series) 05/21/2039 ---

## 2025-08-12 ENCOUNTER — PATIENT MESSAGE (OUTPATIENT)
Dept: OBSTETRICS AND GYNECOLOGY | Facility: CLINIC | Age: 60
End: 2025-08-12
Payer: COMMERCIAL

## 2025-08-12 ENCOUNTER — PATIENT MESSAGE (OUTPATIENT)
Dept: SURGERY | Facility: CLINIC | Age: 60
End: 2025-08-12
Payer: COMMERCIAL

## 2025-08-21 ENCOUNTER — OFFICE VISIT (OUTPATIENT)
Dept: UROGYNECOLOGY | Facility: CLINIC | Age: 60
End: 2025-08-21
Attending: OBSTETRICS & GYNECOLOGY
Payer: COMMERCIAL

## 2025-08-26 ENCOUNTER — TELEPHONE (OUTPATIENT)
Dept: UROGYNECOLOGY | Facility: CLINIC | Age: 60
End: 2025-08-26
Payer: COMMERCIAL